# Patient Record
Sex: MALE | Race: WHITE | HISPANIC OR LATINO
[De-identification: names, ages, dates, MRNs, and addresses within clinical notes are randomized per-mention and may not be internally consistent; named-entity substitution may affect disease eponyms.]

---

## 2020-09-01 ENCOUNTER — TRANSCRIPTION ENCOUNTER (OUTPATIENT)
Age: 50
End: 2020-09-01

## 2024-05-23 ENCOUNTER — APPOINTMENT (OUTPATIENT)
Dept: ORTHOPEDIC SURGERY | Facility: CLINIC | Age: 54
End: 2024-05-23
Payer: COMMERCIAL

## 2024-05-23 VITALS — WEIGHT: 172 LBS | BODY MASS INDEX: 24.62 KG/M2 | HEIGHT: 70 IN

## 2024-05-23 DIAGNOSIS — M77.11 LATERAL EPICONDYLITIS, RIGHT ELBOW: ICD-10-CM

## 2024-05-23 DIAGNOSIS — Z87.898 PERSONAL HISTORY OF OTHER SPECIFIED CONDITIONS: ICD-10-CM

## 2024-05-23 DIAGNOSIS — M77.12 LATERAL EPICONDYLITIS, LEFT ELBOW: ICD-10-CM

## 2024-05-23 DIAGNOSIS — M23.92 UNSPECIFIED INTERNAL DERANGEMENT OF LEFT KNEE: ICD-10-CM

## 2024-05-23 PROBLEM — Z00.00 ENCOUNTER FOR PREVENTIVE HEALTH EXAMINATION: Status: ACTIVE | Noted: 2024-05-23

## 2024-05-23 PROCEDURE — 20551 NJX 1 TENDON ORIGIN/INSJ: CPT | Mod: LT

## 2024-05-23 PROCEDURE — 99204 OFFICE O/P NEW MOD 45 MIN: CPT | Mod: 25

## 2024-05-23 PROCEDURE — 73564 X-RAY EXAM KNEE 4 OR MORE: CPT | Mod: LT

## 2024-05-23 PROCEDURE — 73080 X-RAY EXAM OF ELBOW: CPT | Mod: 50

## 2024-05-23 PROCEDURE — J3490M: CUSTOM

## 2024-05-23 RX ORDER — MELOXICAM 15 MG/1
15 TABLET ORAL DAILY
Qty: 30 | Refills: 1 | Status: ACTIVE | COMMUNITY
Start: 2024-05-23 | End: 2024-07-22

## 2024-05-23 NOTE — HISTORY OF PRESENT ILLNESS
[de-identified] : 5/23/24: Patient is here for left knee that began in 2023, not due to injury. Plays basketball frequently. History of ACL repair in 2019 with Dr. Wynn. No swelling. Pain is anterior. No clicking. Buckling/locking. Worsens with prolonged activity/bending. Wears brace. Mentioned bilateral elbow pain. Right is worse than left. Pain is lateral. No recent treatment. Did not try medication for pain.

## 2024-05-23 NOTE — PROCEDURE
[FreeTextEntry3] : Tendon origin injection was performed of the RIGHT lateral epicondyle. The indication for this procedure was pain and inflammation. The site was prepped with alcohol, betadine, ethyl chloride sprayed topically and sterile technique used. An injection of Betamethasone (Celestone) 1cc of 3mg, Bupivacaine (Marcaine) 2cc of 0.25%  was used.  Patient has tried OTC's including aspirin, Ibuprofen, Aleve, etc or prescription NSAIDS, and/or exercises at home and/or physical therapy without satisfactory response, patient had decreased mobility in the joint and the risks benefits, and alternatives have been discussed, and verbal consent was obtained.

## 2024-05-23 NOTE — ASSESSMENT
[address]  [address] [facility], [address] [address] [phone]               Thank you for choosing us for your health care visit with Raj Mcallister DO.   We are glad to serve you and happy to provide you with this givens [FreeTextEntry1] : Atraumatic bilateral heel pain and this very active 54-year-old.  Enjoys pickleball as well as working out.  The right side is the most symptomatic today.  X-rays unremarkable. We reviewed the underlying diagnosis in detail.  We reviewed today's x-rays in detail. Expect tennis elbow is a chronic issue usually lasting over a year and necessitating intermittent treatments during that time. We reviewed a comprehensive treatment plan in order to address his pain, inflammation and function. In order to address his immediate pain, we discussed a corticosteroid injection to the lateral epicondyle today.  In order to address the inflammation prescription anti-inflammatories will be sent to the pharmacy.  In order to address the elbow function, we discussed a comprehensive home exercise program and activity modification plan.  Proper gripping and lifting reviewed in detail; avoid strenuous repetitive lifting. Formal physical therapy is planned for next visit if still symptomatic. Discussed surgical repair for refractory cases.  The knee is a more chronic issue.  He underwent ACL reconstruction with patella tendon allograft and Endobutton fixation 5 years ago and did well until approximately a year ago when he thinks he reinjured during basketball.  There has been instability episodes and occasional mechanical symptoms since.  This has gotten worse over the last few weeks.  He is wearing a brace but has had marked difficulty with playing.  The x-rays suggest degenerative changes typical of a post ACL reconstruction but there is still enough tibiofemoral space.  I am concerned for a meniscal tear there is a reasonable endpoint suggesting at least some of the ACL still intact.  Given the duration of symptoms and the mechanical symptoms with prior surgery I do think an MRI is warranted to better evaluate next treatment options.  We discussed corticosteroid injection viscosupplementation and if there is meniscal pathology may be amenable to repeat arthroscopy.  We will start him on meloxicam prescription to address the symptoms.  Side effects reviewed.   The patient's current medication management of their orthopedic diagnosis was reviewed today: (1) We discussed a comprehensive treatment plan that included pharmaceutical management involving the use of prescription medications. (2) There is a moderate risk of morbidity with further treatment, especially from use of prescription strength medications and possible side effects of these medications which include upset stomach with oral medications, skin reactions to topical medications and cardiac/renal/diabetes issues with long term use. (3) I recommended that the patient follow-up with their medical physician to discuss any significant specific potential issues with long term medication use such as interactions with current medications or with exacerbation of underlying medical comorbidities. (4) The benefits and risks associated with use of injectable, oral or topical, prescription and over the counter anti-inflammatory medications were discussed with the patient. The patient voiced understanding of the risks including but not limited to bleeding, stroke, kidney dysfunction, heart disease, and were referred to the black box warning label for further information.   Referral Details     Referred By    Referred To    DO Breann Walker   [address]   Phone:  [phone]   Fax:  [phone]    Diagnoses:  Trigger ring finger of right hand   Order:  Orthopedic - Internal    B If you are confident that your benefit plan will not require a referral or authorization, such as [place] Medicaid, please feel free to schedule your appointment immediately.  However, if you are unsure about the requirements for authorization, please wait medical emergencies, dial 911. Visit [email or web address] to learn more.            Visit [facility] online at  [email or web address]

## 2024-05-23 NOTE — IMAGING
[de-identified] : Right Elbow Exam:  Inspection: No swelling, no ecchymosis Palpation: Tenderness to palpation over lateral epicondyle Range of motion: Full elbow flexion, extension, supination, pronation Strength: 5/5 strength in flexion, extension, supination, pronation Special testing: Lateral Elbow pain with resisted wrist extension Stability: No Varus or Valgus instability Motor and sensory intact distally  Left Knee Exam: Inspection: No effusion, no warmth, no ecchymosis Palpation: Medial joint line tenderness to palpation, negative Gladys Range of motion: 0-130 with mild anterior crepitus Strength: 5/5 quadriceps and hamstring strength Stability: Equivocal lachman Motor and sensory intact distally Gait: Non antalgic gait   [Bilateral] : elbow bilaterally [There are no fractures, subluxations or dislocations. No significant abnormalities are seen] : There are no fractures, subluxations or dislocations. No significant abnormalities are seen [Left] : left knee [All Views] : anteroposterior, lateral, skyline, and anteroposterior standing [Degenerative change] : Degenerative change [Tunnels and hardware in proper position] : Tunnels and hardware in proper position

## 2024-05-29 ENCOUNTER — APPOINTMENT (OUTPATIENT)
Dept: MRI IMAGING | Facility: CLINIC | Age: 54
End: 2024-05-29
Payer: COMMERCIAL

## 2024-05-29 PROCEDURE — 73721 MRI JNT OF LWR EXTRE W/O DYE: CPT | Mod: LT

## 2024-06-12 ENCOUNTER — APPOINTMENT (OUTPATIENT)
Dept: ORTHOPEDIC SURGERY | Facility: CLINIC | Age: 54
End: 2024-06-12
Payer: COMMERCIAL

## 2024-06-12 VITALS — BODY MASS INDEX: 24.62 KG/M2 | HEIGHT: 70 IN | WEIGHT: 172 LBS

## 2024-06-12 DIAGNOSIS — S83.519A SPRAIN OF ANTERIOR CRUCIATE LIGAMENT OF UNSPECIFIED KNEE, INITIAL ENCOUNTER: ICD-10-CM

## 2024-06-12 DIAGNOSIS — M17.12 UNILATERAL PRIMARY OSTEOARTHRITIS, LEFT KNEE: ICD-10-CM

## 2024-06-12 PROCEDURE — 99214 OFFICE O/P EST MOD 30 MIN: CPT

## 2024-06-12 NOTE — ASSESSMENT
[FreeTextEntry1] : Reviewed all MRI findings in detail. 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 Exam with good end point today. Reports minimal instability episodes, intermittently during activity. He is very active in basketball and pickleball, recommend to wear brace for all activities and to continue as tolerated. Will start on a course of PT for strengthening to aide in decreasing any instability episodes - rx provided today. He has had good relief with meloxicam so far. Advised to continue as needed. RTC 1 month for re-eval, can consider CSI or Visco supplementation.

## 2024-06-12 NOTE — IMAGING
[de-identified] : Left Knee Exam: Inspection: No effusion, no warmth, no ecchymosis Palpation: Medial joint line tenderness to palpation, negative Gladys Range of motion: 0-130 with mild anterior crepitus Strength: 5/5 quadriceps and hamstring strength Stability: Equivocal lachman Motor and sensory intact distally Gait: Non antalgic gait

## 2024-06-12 NOTE — HISTORY OF PRESENT ILLNESS
[1] : 2 [0] : 0 [Dull/Aching] : dull/aching [de-identified] : 6/12/24: Here for f/up left knee. Had MRI done. Reports no pain in elbow today, good relief with CSI.  [FreeTextEntry1] : L knee

## 2024-07-17 ENCOUNTER — APPOINTMENT (OUTPATIENT)
Dept: ORTHOPEDIC SURGERY | Facility: CLINIC | Age: 54
End: 2024-07-17

## 2024-07-17 ENCOUNTER — RX RENEWAL (OUTPATIENT)
Age: 54
End: 2024-07-17

## 2024-07-26 ENCOUNTER — APPOINTMENT (OUTPATIENT)
Dept: ORTHOPEDIC SURGERY | Facility: CLINIC | Age: 54
End: 2024-07-26
Payer: COMMERCIAL

## 2024-07-26 DIAGNOSIS — M17.12 UNILATERAL PRIMARY OSTEOARTHRITIS, LEFT KNEE: ICD-10-CM

## 2024-07-26 DIAGNOSIS — M75.21 BICIPITAL TENDINITIS, RIGHT SHOULDER: ICD-10-CM

## 2024-07-26 DIAGNOSIS — M19.011 PRIMARY OSTEOARTHRITIS, RIGHT SHOULDER: ICD-10-CM

## 2024-07-26 DIAGNOSIS — M23.92 UNSPECIFIED INTERNAL DERANGEMENT OF LEFT KNEE: ICD-10-CM

## 2024-07-26 PROCEDURE — 73010 X-RAY EXAM OF SHOULDER BLADE: CPT | Mod: RT

## 2024-07-26 PROCEDURE — 20611 DRAIN/INJ JOINT/BURSA W/US: CPT | Mod: RT

## 2024-07-26 PROCEDURE — 99213 OFFICE O/P EST LOW 20 MIN: CPT | Mod: 25

## 2024-07-26 PROCEDURE — 73030 X-RAY EXAM OF SHOULDER: CPT | Mod: RT

## 2024-07-26 PROCEDURE — J3490M: CUSTOM

## 2024-07-26 NOTE — IMAGING
[de-identified] : Right Shoulder Exam: Inspection: No swelling, no ecchymosis, no deformity, no scapular winging. Palpation: Tenderness to palpation over anterior shoulder, No tenderness to palpation over AC joint or trapezius. Range of motion:  Forward flexion: Active 180 degrees; Passive 180 degrees Abduction: Active 180 degrees; Passive 180 degrees External rotation (with shoulder abducted): Active 90 degrees , Passive 90 degrees Strength: 5/5 supraspinatus, 5/5 infraspinatus and 5/5 subscapularis. Special testing: Negative Coley test, negative impingement sign, negative Lanza test, speeds and Yergason negative Motor and sensory intact distally  Left Knee Exam: Inspection: No effusion, no warmth, no ecchymosis Palpation: Medial joint line tenderness to palpation, negative Gladys Range of motion: 0-130 with mild anterior crepitus Strength: 5/5 quadriceps and hamstring strength Stability: Ligamentously stable Motor and sensory intact distally Gait: Non antalgic gait   [Right] : right shoulder [Degenerative change] : Degenerative change [AC Joint Arthrosis] : AC Joint Arthrosis

## 2024-07-26 NOTE — ASSESSMENT
[FreeTextEntry1] : 53yo male with atraumatic right shoulder pain x 2 months  X-rays reviewed in detail with patient - arthritis of shoulder joint, AC joint  Diagnosis of biceps tendinitis discussed with patient, prognosis uncertain at this time  Left knee doing well - notes some weakness, does not like PT Discussed availability of CSI injection - patient elects to proceed - tolerated procedure well  Post Procedure Instructions: Patient was advised to call if redness, pain, or fever occur. Apply ice for 15 min. every 2 hours for the next 12-24 hours as tolerated. Patient was advised to rest the joint(s) for 1-2 days. All questions answered RTC 2-3 months/prn   I am working today under the supervision of Dr. Saexna and I am following the plan of care of Dr. Saxena as described by him on this date. Progress note completed by Karyn Barrett PA-C under the supervision of Dr. Saxena.

## 2024-07-26 NOTE — HISTORY OF PRESENT ILLNESS
[de-identified] : 5/23/24: Patient is here for left knee that began in 2023, not due to injury. Plays basketball frequently. History of ACL repair in 2019 with Dr. Wynn. No swelling. Pain is anterior. No clicking. Buckling/locking. Worsens with prolonged activity/bending. Wears brace. Mentioned bilateral elbow pain. Right is worse than left. Pain is lateral. No recent treatment. Did not try medication for pain.   7/26/2024: patient here for f/u L knee pain. C/o R shoulder pain that began beginning of 6/2024 after pickle ball. R elbow feeling good after CSI injection after last visit with MARTHA Grant. Hx of R shoulder sx in 2019 - RTC, biceps, and labrum. Pt c/o sharp R shoulder pain w/ abduction, worse after playing pickle ball. Pain is better with rest. His concern is that he feels somewhat similar to when he tore is RTC in 2019. He was doing PT for the L knee, but was not seeing improvement, so has not been. He c/o L knee weakness, no c/o pain. He continues to wear his L knee brace.

## 2024-09-05 ENCOUNTER — APPOINTMENT (OUTPATIENT)
Dept: ORTHOPEDIC SURGERY | Facility: CLINIC | Age: 54
End: 2024-09-05
Payer: COMMERCIAL

## 2024-09-05 VITALS — WEIGHT: 172 LBS | BODY MASS INDEX: 32.47 KG/M2 | HEIGHT: 61 IN

## 2024-09-05 DIAGNOSIS — M75.42 IMPINGEMENT SYNDROME OF LEFT SHOULDER: ICD-10-CM

## 2024-09-05 DIAGNOSIS — M75.21 BICIPITAL TENDINITIS, RIGHT SHOULDER: ICD-10-CM

## 2024-09-05 DIAGNOSIS — M75.52 BURSITIS OF LEFT SHOULDER: ICD-10-CM

## 2024-09-05 DIAGNOSIS — M19.011 PRIMARY OSTEOARTHRITIS, RIGHT SHOULDER: ICD-10-CM

## 2024-09-05 DIAGNOSIS — Z98.890 OTHER SPECIFIED POSTPROCEDURAL STATES: ICD-10-CM

## 2024-09-05 PROCEDURE — 73030 X-RAY EXAM OF SHOULDER: CPT | Mod: LT

## 2024-09-05 PROCEDURE — 20611 DRAIN/INJ JOINT/BURSA W/US: CPT | Mod: LT

## 2024-09-05 PROCEDURE — 73010 X-RAY EXAM OF SHOULDER BLADE: CPT | Mod: LT

## 2024-09-05 PROCEDURE — J3490M: CUSTOM

## 2024-09-05 PROCEDURE — 99213 OFFICE O/P EST LOW 20 MIN: CPT | Mod: 25

## 2024-09-05 NOTE — HISTORY OF PRESENT ILLNESS
[de-identified] : 9/5/24: Patient is here for left shoulder pain that began in 8/2024, not due to injury. Denies n/t. Pain does not radiate. No clicking. Worsens with lifting/extending. No prior injury. No formal treatment to date.

## 2024-09-05 NOTE — ASSESSMENT
[FreeTextEntry1] : Now presents for new complaint atraumatic left shoulder pain for 1 month.  Range of motion and strength well-preserved.  X-rays unremarkable today.  Seems to be a case of impingement and bursitis.  He had an extensive history on the right shoulder with prior surgery and ongoing issues.   Subacromial injection administered for relief.  Home exercise program reviewed.  If no improvement consider formal course of physical therapy and eventually MRI.

## 2024-09-05 NOTE — PROCEDURE
[FreeTextEntry3] : - Large joint injection was performed of the left subacromial space.  - The indication for this procedure was pain and inflammation. Patient has tried OTC's including aspirin, Ibuprofen, Aleve, etc or prescription NSAIDS, and/or exercises at home and/or physical therapy without satisfactory response, patient had decreased mobility in the joint and the risks benefits, and alternatives have been discussed, and verbal consent was obtained. The site was prepped with alcohol, betadine, ethyl chloride sprayed topically and sterile technique used.  - An injection of Betamethasone 2cc; Marcaine 5cc injected. - Patient was advised to call if redness, pain or fever occur, apply ice for 15 minutes out of every hour for the next 12-24 hours as tolerated and patient was advised to rest the joint(s) for 2 days.  - Ultrasound guidance was indicated for this patient due to prior failure or difficult injection. All ultrasound images have been permanently captured and stored accordingly in our picture archiving and communication system. Visualization of the needle and placement of injection was performed without complication.

## 2024-09-05 NOTE — IMAGING
[de-identified] : Left Shoulder Exam Inspection: No swelling, no ecchymosis, no miriam deformity Palpation: Tenderness to palpation over greater tuberosity Stability: No instability or tenderness over AC joint Range of Motion: Active Forward Flexion 180 Passive FF: 180; ER: 90: IR: 70; ER at the side 50 Strength: 5/5 supraspinatus, infraspinatus and subscapularis; there is pain with strength testing Special testing: Positive Coley test, positive impingement sign; Speeds and yergason negative Neuro: Motor and sensory intact distally [Left] : left shoulder [There are no fractures, subluxations or dislocations. No significant abnormalities are seen] : There are no fractures, subluxations or dislocations. No significant abnormalities are seen

## 2024-10-03 ENCOUNTER — APPOINTMENT (OUTPATIENT)
Dept: MRI IMAGING | Facility: CLINIC | Age: 54
End: 2024-10-03

## 2024-10-03 PROCEDURE — 73221 MRI JOINT UPR EXTREM W/O DYE: CPT | Mod: LT

## 2024-10-10 ENCOUNTER — APPOINTMENT (OUTPATIENT)
Dept: ORTHOPEDIC SURGERY | Facility: CLINIC | Age: 54
End: 2024-10-10

## 2024-10-28 ENCOUNTER — APPOINTMENT (OUTPATIENT)
Dept: ORTHOPEDIC SURGERY | Facility: CLINIC | Age: 54
End: 2024-10-28
Payer: COMMERCIAL

## 2024-10-28 DIAGNOSIS — M75.42 IMPINGEMENT SYNDROME OF LEFT SHOULDER: ICD-10-CM

## 2024-10-28 DIAGNOSIS — M75.122 COMPLETE ROTATOR CUFF TEAR OR RUPTURE OF LEFT SHOULDER, NOT SPECIFIED AS TRAUMATIC: ICD-10-CM

## 2024-10-28 DIAGNOSIS — S43.432D SUPERIOR GLENOID LABRUM LESION OF LEFT SHOULDER, SUBSEQUENT ENCOUNTER: ICD-10-CM

## 2024-10-28 PROCEDURE — 99214 OFFICE O/P EST MOD 30 MIN: CPT

## 2024-10-28 RX ORDER — MELOXICAM 15 MG/1
15 TABLET ORAL DAILY
Qty: 30 | Refills: 1 | Status: ACTIVE | COMMUNITY
Start: 2024-10-28 | End: 2024-12-27

## 2024-12-26 ENCOUNTER — APPOINTMENT (OUTPATIENT)
Dept: ORTHOPEDIC SURGERY | Facility: CLINIC | Age: 54
End: 2024-12-26

## 2024-12-26 PROCEDURE — L3670: CPT | Mod: LT

## 2025-01-13 ENCOUNTER — NON-APPOINTMENT (OUTPATIENT)
Age: 55
End: 2025-01-13

## 2025-01-14 ENCOUNTER — APPOINTMENT (OUTPATIENT)
Age: 55
End: 2025-01-14

## 2025-01-14 PROCEDURE — 29826 SHO ARTHRS SRG DECOMPRESSION: CPT | Mod: AS,LT

## 2025-01-14 PROCEDURE — 29827 SHO ARTHRS SRG RT8TR CUF RPR: CPT | Mod: LT

## 2025-01-14 PROCEDURE — 29827 SHO ARTHRS SRG RT8TR CUF RPR: CPT | Mod: AS,LT

## 2025-01-14 PROCEDURE — 29823 SHO ARTHRS SRG XTNSV DBRDMT: CPT | Mod: 59,LT

## 2025-01-14 PROCEDURE — 29826 SHO ARTHRS SRG DECOMPRESSION: CPT | Mod: LT

## 2025-01-14 PROCEDURE — 29823 SHO ARTHRS SRG XTNSV DBRDMT: CPT | Mod: AS,59,LT

## 2025-01-14 RX ORDER — OXYCODONE AND ACETAMINOPHEN 5; 325 MG/1; MG/1
5-325 TABLET ORAL
Qty: 45 | Refills: 0 | Status: ACTIVE | COMMUNITY
Start: 2025-01-14 | End: 1900-01-01

## 2025-01-17 ENCOUNTER — NON-APPOINTMENT (OUTPATIENT)
Age: 55
End: 2025-01-17

## 2025-01-23 ENCOUNTER — APPOINTMENT (OUTPATIENT)
Dept: ORTHOPEDIC SURGERY | Facility: CLINIC | Age: 55
End: 2025-01-23
Payer: COMMERCIAL

## 2025-01-23 VITALS — BODY MASS INDEX: 32.47 KG/M2 | WEIGHT: 172 LBS | HEIGHT: 61 IN

## 2025-01-23 DIAGNOSIS — S46.012D STRAIN OF MUSCLE(S) AND TENDON(S) OF THE ROTATOR CUFF OF LEFT SHOULDER, SUBSEQUENT ENCOUNTER: ICD-10-CM

## 2025-01-23 PROCEDURE — 99024 POSTOP FOLLOW-UP VISIT: CPT

## 2025-02-27 ENCOUNTER — APPOINTMENT (OUTPATIENT)
Dept: ORTHOPEDIC SURGERY | Facility: CLINIC | Age: 55
End: 2025-02-27
Payer: COMMERCIAL

## 2025-02-27 VITALS — BODY MASS INDEX: 32.47 KG/M2 | HEIGHT: 61 IN | WEIGHT: 172 LBS

## 2025-02-27 DIAGNOSIS — M17.12 UNILATERAL PRIMARY OSTEOARTHRITIS, LEFT KNEE: ICD-10-CM

## 2025-02-27 DIAGNOSIS — M75.122 COMPLETE ROTATOR CUFF TEAR OR RUPTURE OF LEFT SHOULDER, NOT SPECIFIED AS TRAUMATIC: ICD-10-CM

## 2025-02-27 DIAGNOSIS — M23.92 UNSPECIFIED INTERNAL DERANGEMENT OF LEFT KNEE: ICD-10-CM

## 2025-02-27 PROCEDURE — 99024 POSTOP FOLLOW-UP VISIT: CPT

## 2025-03-21 ENCOUNTER — APPOINTMENT (OUTPATIENT)
Dept: ORTHOPEDIC SURGERY | Facility: CLINIC | Age: 55
End: 2025-03-21
Payer: COMMERCIAL

## 2025-03-21 VITALS — BODY MASS INDEX: 32.47 KG/M2 | HEIGHT: 61 IN | WEIGHT: 172 LBS

## 2025-03-21 PROCEDURE — 99213 OFFICE O/P EST LOW 20 MIN: CPT | Mod: 24,25

## 2025-03-21 PROCEDURE — 20611 DRAIN/INJ JOINT/BURSA W/US: CPT | Mod: 79,LT

## 2025-03-24 ENCOUNTER — APPOINTMENT (OUTPATIENT)
Dept: ORTHOPEDIC SURGERY | Facility: CLINIC | Age: 55
End: 2025-03-24
Payer: COMMERCIAL

## 2025-03-24 PROCEDURE — 20611 DRAIN/INJ JOINT/BURSA W/US: CPT | Mod: LT

## 2025-03-28 ENCOUNTER — APPOINTMENT (OUTPATIENT)
Dept: ORTHOPEDIC SURGERY | Facility: CLINIC | Age: 55
End: 2025-03-28
Payer: COMMERCIAL

## 2025-03-28 VITALS — WEIGHT: 172 LBS | BODY MASS INDEX: 32.47 KG/M2 | HEIGHT: 61 IN

## 2025-03-28 DIAGNOSIS — M19.011 PRIMARY OSTEOARTHRITIS, RIGHT SHOULDER: ICD-10-CM

## 2025-03-28 DIAGNOSIS — M17.12 UNILATERAL PRIMARY OSTEOARTHRITIS, LEFT KNEE: ICD-10-CM

## 2025-03-28 DIAGNOSIS — M75.122 COMPLETE ROTATOR CUFF TEAR OR RUPTURE OF LEFT SHOULDER, NOT SPECIFIED AS TRAUMATIC: ICD-10-CM

## 2025-03-28 PROCEDURE — 20611 DRAIN/INJ JOINT/BURSA W/US: CPT | Mod: 79,LT

## 2025-03-28 PROCEDURE — 99024 POSTOP FOLLOW-UP VISIT: CPT
